# Patient Record
Sex: MALE | Race: WHITE | NOT HISPANIC OR LATINO | Employment: OTHER | URBAN - METROPOLITAN AREA
[De-identification: names, ages, dates, MRNs, and addresses within clinical notes are randomized per-mention and may not be internally consistent; named-entity substitution may affect disease eponyms.]

---

## 2024-05-18 ENCOUNTER — HOSPITAL ENCOUNTER (EMERGENCY)
Facility: HOSPITAL | Age: 64
Discharge: HOME | End: 2024-05-19
Attending: EMERGENCY MEDICINE
Payer: COMMERCIAL

## 2024-05-18 DIAGNOSIS — R10.13 EPIGASTRIC ABDOMINAL PAIN: ICD-10-CM

## 2024-05-18 DIAGNOSIS — R14.1 ABDOMINAL GAS PAIN: Primary | ICD-10-CM

## 2024-05-18 DIAGNOSIS — K80.20 GALLSTONES: ICD-10-CM

## 2024-05-18 LAB
ALBUMIN SERPL BCP-MCNC: 4.2 G/DL (ref 3.5–5)
ALP SERPL-CCNC: 88 U/L (ref 34–104)
ALT SERPL W P-5'-P-CCNC: 33 U/L (ref 7–52)
ANION GAP SERPL CALCULATED.3IONS-SCNC: 6 MMOL/L (ref 4–13)
APTT PPP: 29 SECONDS (ref 23–37)
AST SERPL W P-5'-P-CCNC: 50 U/L (ref 13–39)
BASOPHILS # BLD MANUAL: 0.07 THOUSAND/UL (ref 0–0.1)
BASOPHILS NFR MAR MANUAL: 1 % (ref 0–1)
BILIRUB SERPL-MCNC: 0.67 MG/DL (ref 0.2–1)
BNP SERPL-MCNC: 28 PG/ML (ref 0–100)
BUN SERPL-MCNC: 23 MG/DL (ref 5–25)
CALCIUM SERPL-MCNC: 9.4 MG/DL (ref 8.4–10.2)
CARDIAC TROPONIN I PNL SERPL HS: 6 NG/L
CHLORIDE SERPL-SCNC: 106 MMOL/L (ref 96–108)
CO2 SERPL-SCNC: 30 MMOL/L (ref 21–32)
CREAT SERPL-MCNC: 1.11 MG/DL (ref 0.6–1.3)
EOSINOPHIL # BLD MANUAL: 0.13 THOUSAND/UL (ref 0–0.4)
EOSINOPHIL NFR BLD MANUAL: 2 % (ref 0–6)
ERYTHROCYTE [DISTWIDTH] IN BLOOD BY AUTOMATED COUNT: 13.3 % (ref 11.6–15.1)
GFR SERPL CREATININE-BSD FRML MDRD: 71 ML/MIN/1.73SQ M
GLUCOSE SERPL-MCNC: 95 MG/DL (ref 65–140)
HCT VFR BLD AUTO: 45.4 % (ref 36.5–49.3)
HGB BLD-MCNC: 14.7 G/DL (ref 12–17)
INR PPP: 0.98 (ref 0.84–1.19)
LACTATE SERPL-SCNC: 0.8 MMOL/L (ref 0.5–2)
LIPASE SERPL-CCNC: 89 U/L (ref 11–82)
LYMPHOCYTES # BLD AUTO: 2.59 THOUSAND/UL (ref 0.6–4.47)
LYMPHOCYTES # BLD AUTO: 34 % (ref 14–44)
MCH RBC QN AUTO: 29.5 PG (ref 26.8–34.3)
MCHC RBC AUTO-ENTMCNC: 32.4 G/DL (ref 31.4–37.4)
MCV RBC AUTO: 91 FL (ref 82–98)
MONOCYTES # BLD AUTO: 0.4 THOUSAND/UL (ref 0–1.22)
MONOCYTES NFR BLD: 6 % (ref 4–12)
NEUTROPHILS # BLD MANUAL: 3.45 THOUSAND/UL (ref 1.85–7.62)
NEUTS BAND NFR BLD MANUAL: 1 % (ref 0–8)
NEUTS SEG NFR BLD AUTO: 51 % (ref 43–75)
PLATELET # BLD AUTO: 171 THOUSANDS/UL (ref 149–390)
PLATELET BLD QL SMEAR: ADEQUATE
PMV BLD AUTO: 11.2 FL (ref 8.9–12.7)
POTASSIUM SERPL-SCNC: 4 MMOL/L (ref 3.5–5.3)
PROT SERPL-MCNC: 7.3 G/DL (ref 6.4–8.4)
PROTHROMBIN TIME: 13.4 SECONDS (ref 11.6–14.5)
RBC # BLD AUTO: 4.98 MILLION/UL (ref 3.88–5.62)
RBC MORPH BLD: NORMAL
SODIUM SERPL-SCNC: 142 MMOL/L (ref 135–147)
VARIANT LYMPHS # BLD AUTO: 5 %
WBC # BLD AUTO: 6.64 THOUSAND/UL (ref 4.31–10.16)

## 2024-05-18 PROCEDURE — 85027 COMPLETE CBC AUTOMATED: CPT | Performed by: EMERGENCY MEDICINE

## 2024-05-18 PROCEDURE — 99284 EMERGENCY DEPT VISIT MOD MDM: CPT

## 2024-05-18 PROCEDURE — 80053 COMPREHEN METABOLIC PANEL: CPT | Performed by: EMERGENCY MEDICINE

## 2024-05-18 PROCEDURE — 96375 TX/PRO/DX INJ NEW DRUG ADDON: CPT

## 2024-05-18 PROCEDURE — C9113 INJ PANTOPRAZOLE SODIUM, VIA: HCPCS | Performed by: EMERGENCY MEDICINE

## 2024-05-18 PROCEDURE — 83880 ASSAY OF NATRIURETIC PEPTIDE: CPT | Performed by: EMERGENCY MEDICINE

## 2024-05-18 PROCEDURE — 84484 ASSAY OF TROPONIN QUANT: CPT | Performed by: EMERGENCY MEDICINE

## 2024-05-18 PROCEDURE — 96374 THER/PROPH/DIAG INJ IV PUSH: CPT

## 2024-05-18 PROCEDURE — 85610 PROTHROMBIN TIME: CPT | Performed by: EMERGENCY MEDICINE

## 2024-05-18 PROCEDURE — 99285 EMERGENCY DEPT VISIT HI MDM: CPT | Performed by: EMERGENCY MEDICINE

## 2024-05-18 PROCEDURE — 36415 COLL VENOUS BLD VENIPUNCTURE: CPT | Performed by: EMERGENCY MEDICINE

## 2024-05-18 PROCEDURE — 85007 BL SMEAR W/DIFF WBC COUNT: CPT | Performed by: EMERGENCY MEDICINE

## 2024-05-18 PROCEDURE — 85730 THROMBOPLASTIN TIME PARTIAL: CPT | Performed by: EMERGENCY MEDICINE

## 2024-05-18 PROCEDURE — 83605 ASSAY OF LACTIC ACID: CPT | Performed by: EMERGENCY MEDICINE

## 2024-05-18 PROCEDURE — 83690 ASSAY OF LIPASE: CPT | Performed by: EMERGENCY MEDICINE

## 2024-05-18 PROCEDURE — 93005 ELECTROCARDIOGRAM TRACING: CPT

## 2024-05-18 RX ORDER — PANTOPRAZOLE SODIUM 40 MG/10ML
40 INJECTION, POWDER, LYOPHILIZED, FOR SOLUTION INTRAVENOUS ONCE
Status: COMPLETED | OUTPATIENT
Start: 2024-05-18 | End: 2024-05-18

## 2024-05-18 RX ORDER — HYDROMORPHONE HCL/PF 1 MG/ML
0.5 SYRINGE (ML) INJECTION ONCE
Status: COMPLETED | OUTPATIENT
Start: 2024-05-18 | End: 2024-05-18

## 2024-05-18 RX ORDER — SIMETHICONE 80 MG
160 TABLET,CHEWABLE ORAL ONCE
Status: COMPLETED | OUTPATIENT
Start: 2024-05-18 | End: 2024-05-18

## 2024-05-18 RX ADMIN — PANTOPRAZOLE SODIUM 40 MG: 40 INJECTION, POWDER, FOR SOLUTION INTRAVENOUS at 22:36

## 2024-05-18 RX ADMIN — HYDROMORPHONE HYDROCHLORIDE 0.5 MG: 1 INJECTION, SOLUTION INTRAMUSCULAR; INTRAVENOUS; SUBCUTANEOUS at 22:37

## 2024-05-18 RX ADMIN — SIMETHICONE 160 MG: 80 TABLET, CHEWABLE ORAL at 22:37

## 2024-05-19 ENCOUNTER — APPOINTMENT (EMERGENCY)
Dept: CT IMAGING | Facility: HOSPITAL | Age: 64
End: 2024-05-19
Payer: COMMERCIAL

## 2024-05-19 VITALS
SYSTOLIC BLOOD PRESSURE: 164 MMHG | WEIGHT: 252 LBS | HEIGHT: 65 IN | DIASTOLIC BLOOD PRESSURE: 65 MMHG | RESPIRATION RATE: 17 BRPM | HEART RATE: 71 BPM | OXYGEN SATURATION: 96 % | TEMPERATURE: 98.7 F | BODY MASS INDEX: 41.99 KG/M2

## 2024-05-19 LAB
2HR DELTA HS TROPONIN: 1 NG/L
ATRIAL RATE: 66 BPM
CARDIAC TROPONIN I PNL SERPL HS: 7 NG/L
P AXIS: 66 DEGREES
PR INTERVAL: 162 MS
QRS AXIS: 45 DEGREES
QRSD INTERVAL: 92 MS
QT INTERVAL: 380 MS
QTC INTERVAL: 398 MS
T WAVE AXIS: 50 DEGREES
VENTRICULAR RATE: 66 BPM

## 2024-05-19 PROCEDURE — 84484 ASSAY OF TROPONIN QUANT: CPT | Performed by: EMERGENCY MEDICINE

## 2024-05-19 PROCEDURE — 74176 CT ABD & PELVIS W/O CONTRAST: CPT

## 2024-05-19 PROCEDURE — 36415 COLL VENOUS BLD VENIPUNCTURE: CPT | Performed by: EMERGENCY MEDICINE

## 2024-05-19 PROCEDURE — 71250 CT THORAX DX C-: CPT

## 2024-05-19 PROCEDURE — 93010 ELECTROCARDIOGRAM REPORT: CPT | Performed by: INTERNAL MEDICINE

## 2024-05-19 RX ADMIN — IOHEXOL 50 ML: 240 INJECTION, SOLUTION INTRATHECAL; INTRAVASCULAR; INTRAVENOUS; ORAL at 01:02

## 2024-05-19 NOTE — ED PROVIDER NOTES
"History  Chief Complaint   Patient presents with    Abdominal Pain     EMS states that pt was working tonight and had a cheese steak hoagie. EMS stated that pt drank milk and it didn't help with the reflux. Pt is a gastric bypass pt in 2020     62 yo male who is s/p gastric sleeve a few years ago and tonight at work ate cheese steak and shortly after felt like his \"stomach was going to pop\".  Denies actual CP or SOB. Feels bloated.      History provided by:  Patient and EMS personnel   used: No    Abdominal Pain  Pain location:  Epigastric  Pain quality: bloating, gnawing, heavy and pressure    Pain severity:  Moderate  Onset quality:  Sudden  Duration: 30 min.  Timing:  Constant  Progression:  Waxing and waning  Chronicity:  New  Context: eating    Relieved by:  Nothing  Ineffective treatments:  None tried  Associated symptoms: no chest pain, no chills, no cough, no dysuria, no fever, no hematuria, no nausea, no shortness of breath, no sore throat and no vomiting    Multiple surgeries: gastric sleeve.        None       Past Medical History:   Diagnosis Date    Anemia     Asthma     CHF (congestive heart failure) (HCC)     Chronic fatigue syndrome     Coronary artery disease     Hyperlipidemia     Hypertension     Hypothyroidism     Obesity     Respiratory disease        Past Surgical History:   Procedure Laterality Date    CARDIAC CATHETERIZATION      CORONARY ANGIOPLASTY WITH STENT PLACEMENT      4 stents    GASTRIC BYPASS      2020    HERNIA REPAIR      KNEE SURGERY Right     TONSILLECTOMY         History reviewed. No pertinent family history.  I have reviewed and agree with the history as documented.    E-Cigarette/Vaping    E-Cigarette Use Never User      E-Cigarette/Vaping Substances     Social History     Tobacco Use    Smoking status: Never    Smokeless tobacco: Never   Vaping Use    Vaping status: Never Used   Substance Use Topics    Alcohol use: Never    Drug use: Never       Review of " Systems   Constitutional:  Negative for chills and fever.   HENT:  Negative for ear pain and sore throat.    Eyes:  Negative for pain and visual disturbance.   Respiratory:  Negative for cough and shortness of breath.    Cardiovascular:  Negative for chest pain and palpitations.   Gastrointestinal:  Positive for abdominal pain (epigastric - diffuse upper). Negative for nausea and vomiting.   Genitourinary:  Negative for dysuria and hematuria.   Musculoskeletal:  Negative for arthralgias and back pain.   Skin:  Negative for color change and rash.   Neurological:  Negative for seizures and syncope.   All other systems reviewed and are negative.      Physical Exam  Physical Exam  Vitals and nursing note reviewed.   Constitutional:       General: He is not in acute distress.     Appearance: He is well-developed.   HENT:      Head: Normocephalic and atraumatic.   Eyes:      Conjunctiva/sclera: Conjunctivae normal.   Cardiovascular:      Rate and Rhythm: Normal rate and regular rhythm.      Heart sounds: No murmur heard.  Pulmonary:      Effort: Pulmonary effort is normal. No respiratory distress.      Breath sounds: Normal breath sounds.   Abdominal:      General: Bowel sounds are normal.      Palpations: Abdomen is soft.      Tenderness: There is abdominal tenderness in the epigastric area.   Musculoskeletal:         General: No swelling.      Cervical back: Neck supple.   Skin:     General: Skin is warm and dry.      Capillary Refill: Capillary refill takes less than 2 seconds.   Neurological:      Mental Status: He is alert and oriented to person, place, and time.   Psychiatric:         Mood and Affect: Mood normal.         Vital Signs  ED Triage Vitals   Temperature Pulse Respirations Blood Pressure SpO2   05/18/24 2218 05/18/24 2218 05/18/24 2218 05/18/24 2218 05/18/24 2218   98.7 °F (37.1 °C) 65 20 170/80 98 %      Temp Source Heart Rate Source Patient Position - Orthostatic VS BP Location FiO2 (%)   05/18/24 2218  05/18/24 2300 05/18/24 2218 05/18/24 2218 --   Temporal Monitor Lying Left arm       Pain Score       05/18/24 2237       10 - Worst Possible Pain           Vitals:    05/18/24 2218 05/18/24 2300 05/19/24 0100   BP: 170/80 157/79 164/65   Pulse: 65 67 71   Patient Position - Orthostatic VS: Lying           Visual Acuity      ED Medications  Medications   pantoprazole (PROTONIX) injection 40 mg (40 mg Intravenous Given 5/18/24 2236)   HYDROmorphone (DILAUDID) injection 0.5 mg (0.5 mg Intravenous Given 5/18/24 2237)   simethicone (MYLICON) chewable tablet 160 mg (160 mg Oral Given 5/18/24 2237)   iohexol (OMNIPAQUE) 240 MG/ML solution 50 mL (50 mL Oral Given 5/19/24 0102)       Diagnostic Studies  Results Reviewed       Procedure Component Value Units Date/Time    HS Troponin I 2hr [[id number]]  (Normal) Collected: 05/19/24 0014    Lab Status: Final result Specimen: Blood from Arm, Left Updated: 05/19/24 0041     hs TnI 2hr 7 ng/L      Delta 2hr hsTnI 1 ng/L     B-Type Natriuretic Peptide(BNP) [[id number]]  (Normal) Collected: 05/18/24 2221    Lab Status: Final result Specimen: Blood from Arm, Left Updated: 05/18/24 2323     BNP 28 pg/mL     RBC Morphology Reflex Test [[id number]] Collected: 05/18/24 2221    Lab Status: Final result Specimen: Blood from Arm, Left Updated: 05/18/24 2301    CBC and differential [[id number]]  (Normal) Collected: 05/18/24 2221    Lab Status: Final result Specimen: Blood from Arm, Left Updated: 05/18/24 2300     WBC 6.64 Thousand/uL      RBC 4.98 Million/uL      Hemoglobin 14.7 g/dL      Hematocrit 45.4 %      MCV 91 fL      MCH 29.5 pg      MCHC 32.4 g/dL      RDW 13.3 %      MPV 11.2 fL      Platelets 171 Thousands/uL     Narrative:      This is an appended report.  These results have been appended to a previously verified report.    Manual Differential(PHLEBS Do Not Order) [[id number]]  (Abnormal) Collected: 05/18/24 2225    Lab Status: Final result Specimen: Blood from Arm, Left Updated:  05/18/24 2300     Segmented % 51 %      Bands % 1 %      Lymphocytes % 34 %      Monocytes % 6 %      Eosinophils % 2 %      Basophils % 1 %      Atypical Lymphocytes % 5 %      Absolute Neutrophils 3.45 Thousand/uL      Absolute Lymphocytes 2.59 Thousand/uL      Absolute Monocytes 0.40 Thousand/uL      Absolute Eosinophils 0.13 Thousand/uL      Absolute Basophils 0.07 Thousand/uL      Total Counted --     RBC Morphology Normal     Platelet Estimate Adequate    HS Troponin 0hr (reflex protocol) [[id number]]  (Normal) Collected: 05/18/24 2221    Lab Status: Final result Specimen: Blood from Arm, Left Updated: 05/18/24 2251     hs TnI 0hr 6 ng/L     Comprehensive metabolic panel [[id number]]  (Abnormal) Collected: 05/18/24 2221    Lab Status: Final result Specimen: Blood from Arm, Left Updated: 05/18/24 2244     Sodium 142 mmol/L      Potassium 4.0 mmol/L      Chloride 106 mmol/L      CO2 30 mmol/L      ANION GAP 6 mmol/L      BUN 23 mg/dL      Creatinine 1.11 mg/dL      Glucose 95 mg/dL      Calcium 9.4 mg/dL      AST 50 U/L      ALT 33 U/L      Alkaline Phosphatase 88 U/L      Total Protein 7.3 g/dL      Albumin 4.2 g/dL      Total Bilirubin 0.67 mg/dL      eGFR 71 ml/min/1.73sq m     Narrative:      National Kidney Disease Foundation guidelines for Chronic Kidney Disease (CKD):     Stage 1 with normal or high GFR (GFR > 90 mL/min/1.73 square meters)    Stage 2 Mild CKD (GFR = 60-89 mL/min/1.73 square meters)    Stage 3A Moderate CKD (GFR = 45-59 mL/min/1.73 square meters)    Stage 3B Moderate CKD (GFR = 30-44 mL/min/1.73 square meters)    Stage 4 Severe CKD (GFR = 15-29 mL/min/1.73 square meters)    Stage 5 End Stage CKD (GFR <15 mL/min/1.73 square meters)  Note: GFR calculation is accurate only with a steady state creatinine    Lactic acid, plasma (w/reflex if result > 2.0) [[id number]]  (Normal) Collected: 05/18/24 2221    Lab Status: Final result Specimen: Blood from Arm, Left Updated: 05/18/24 2244     LACTIC ACID  0.8 mmol/L     Narrative:      Result may be elevated if tourniquet was used during collection.    Lipase [[id number]]  (Abnormal) Collected: 05/18/24 2221    Lab Status: Final result Specimen: Blood from Arm, Left Updated: 05/18/24 2244     Lipase 89 u/L     Protime-INR [[id number]]  (Normal) Collected: 05/18/24 2221    Lab Status: Final result Specimen: Blood from Arm, Left Updated: 05/18/24 2242     Protime 13.4 seconds      INR 0.98    APTT [[id number]]  (Normal) Collected: 05/18/24 2221    Lab Status: Final result Specimen: Blood from Arm, Left Updated: 05/18/24 2242     PTT 29 seconds                    CT chest abdomen pelvis wo contrast   Final Result by Yaw Sosa MD (05/19 0154)      1.  Status post sleeve gastrectomy. No bowel obstruction.   2.  Cholelithiasis without evidence of cholecystitis.      Workstation performed: [id number]                    Procedures  ECG 12 Lead Documentation Only    Date/Time: 5/18/2024 11:13 PM    Performed by: Claire Langford DO  Authorized by: Claire Langford DO    Patient location:  ED  Interpretation:     Interpretation: normal    Rate:     ECG rate:  66    ECG rate assessment: normal    Rhythm:     Rhythm: sinus rhythm    Ectopy:     Ectopy: none    QRS:     QRS axis:  Normal    QRS intervals:  Normal  Conduction:     Conduction: normal    ST segments:     ST segments:  Normal  T waves:     T waves: normal             ED Course  ED Course as of 05/19/24 0424   Sat May 18, 2024   2216 Pt seen and examined.    2244 CBC, CMP, lipase and lactate WNL.  Will CT r/o dissection.  Will do po contrast due to gastric sleeve hx - unable to give IV dye due to anaphylaxis.   2312 Pt belching and feeling much better.   2319 Pt given oral contrast from CT tech.   Sun May 19, 2024   0047 Pt being taken for CT scan.   0204 CT shows 1.  Status post sleeve gastrectomy. No bowel obstruction.  2.  Cholelithiasis without evidence of cholecystitis.                                                Medical Decision Making  Amount and/or Complexity of Data Reviewed  Labs: ordered.  Radiology: ordered.    Risk  OTC drugs.  Prescription drug management.             Disposition  Final diagnoses:   Abdominal gas pain   Epigastric abdominal pain   Gallstones     Time reflects when diagnosis was documented in both MDM as applicable and the Disposition within this note       Time User Action Codes Description Comment    5/19/2024  2:08 AM Claire Langford Add [R14.1] Abdominal gas pain     5/19/2024  2:08 AM Claire Langford Add [R10.13] Epigastric abdominal pain     5/19/2024  2:08 AM Claire Langford Add [K80.20] Gallstones           ED Disposition       ED Disposition   Discharge    Condition   Stable    Date/Time   Sun May 19, 2024  2:08 AM    Comment   Alexandru Smith discharge to home/self care.                   Follow-up Information       Follow up With Specialties Details Why Contact [facility] Schedule an appointment as soon as possible for a visit  to discuss CT results and ER visit [address] [facility]  [place] [address]  [phone]              There are no discharge medications for this patient.      No discharge procedures on file.    PDMP Review       None            ED Provider  Electronically Signed by             Claire Langford DO  05/19/24 0424

## 2024-10-26 ENCOUNTER — HOSPITAL ENCOUNTER (EMERGENCY)
Facility: HOSPITAL | Age: 64
Discharge: HOME | End: 2024-10-26
Attending: EMERGENCY MEDICINE | Admitting: EMERGENCY MEDICINE
Payer: COMMERCIAL

## 2024-10-26 ENCOUNTER — APPOINTMENT (EMERGENCY)
Dept: CT IMAGING | Facility: HOSPITAL | Age: 64
End: 2024-10-26
Payer: COMMERCIAL

## 2024-10-26 ENCOUNTER — APPOINTMENT (EMERGENCY)
Dept: RADIOLOGY | Facility: HOSPITAL | Age: 64
End: 2024-10-26
Payer: COMMERCIAL

## 2024-10-26 VITALS
DIASTOLIC BLOOD PRESSURE: 67 MMHG | OXYGEN SATURATION: 94 % | SYSTOLIC BLOOD PRESSURE: 156 MMHG | HEART RATE: 62 BPM | TEMPERATURE: 97.8 F | RESPIRATION RATE: 15 BRPM

## 2024-10-26 DIAGNOSIS — M43.00 PARS DEFECT: ICD-10-CM

## 2024-10-26 DIAGNOSIS — K80.20 CHOLELITHIASIS: Primary | ICD-10-CM

## 2024-10-26 DIAGNOSIS — N28.1 RENAL CYST: ICD-10-CM

## 2024-10-26 DIAGNOSIS — R10.9 ABDOMINAL PAIN: ICD-10-CM

## 2024-10-26 DIAGNOSIS — M43.10 ANTEROLISTHESIS: ICD-10-CM

## 2024-10-26 DIAGNOSIS — R74.01 TRANSAMINITIS: ICD-10-CM

## 2024-10-26 LAB
2HR DELTA HS TROPONIN: 0 NG/L
ALBUMIN SERPL BCG-MCNC: 4.5 G/DL (ref 3.5–5)
ALP SERPL-CCNC: 71 U/L (ref 34–104)
ALT SERPL W P-5'-P-CCNC: 59 U/L (ref 7–52)
ANION GAP SERPL CALCULATED.3IONS-SCNC: 8 MMOL/L (ref 4–13)
AST SERPL W P-5'-P-CCNC: 83 U/L (ref 13–39)
BASOPHILS # BLD AUTO: 0.05 THOUSANDS/ΜL (ref 0–0.1)
BASOPHILS NFR BLD AUTO: 1 % (ref 0–1)
BILIRUB SERPL-MCNC: 0.8 MG/DL (ref 0.2–1)
BUN SERPL-MCNC: 22 MG/DL (ref 5–25)
CALCIUM SERPL-MCNC: 9.3 MG/DL (ref 8.4–10.2)
CARDIAC TROPONIN I PNL SERPL HS: 5 NG/L
CARDIAC TROPONIN I PNL SERPL HS: 5 NG/L
CHLORIDE SERPL-SCNC: 101 MMOL/L (ref 96–108)
CO2 SERPL-SCNC: 32 MMOL/L (ref 21–32)
CREAT SERPL-MCNC: 1.18 MG/DL (ref 0.6–1.3)
EOSINOPHIL # BLD AUTO: 0.11 THOUSAND/ΜL (ref 0–0.61)
EOSINOPHIL NFR BLD AUTO: 1 % (ref 0–6)
ERYTHROCYTE [DISTWIDTH] IN BLOOD BY AUTOMATED COUNT: 13 % (ref 11.6–15.1)
GFR SERPL CREATININE-BSD FRML MDRD: 65 ML/MIN/1.73SQ M
GLUCOSE SERPL-MCNC: 103 MG/DL (ref 65–140)
HCT VFR BLD AUTO: 47.9 % (ref 36.5–49.3)
HGB BLD-MCNC: 15.9 G/DL (ref 12–17)
IMM GRANULOCYTES # BLD AUTO: 0.04 THOUSAND/UL (ref 0–0.2)
IMM GRANULOCYTES NFR BLD AUTO: 0 % (ref 0–2)
LIPASE SERPL-CCNC: 106 U/L (ref 11–82)
LYMPHOCYTES # BLD AUTO: 3.38 THOUSANDS/ΜL (ref 0.6–4.47)
LYMPHOCYTES NFR BLD AUTO: 38 % (ref 14–44)
MCH RBC QN AUTO: 30.2 PG (ref 26.8–34.3)
MCHC RBC AUTO-ENTMCNC: 33.2 G/DL (ref 31.4–37.4)
MCV RBC AUTO: 91 FL (ref 82–98)
MONOCYTES # BLD AUTO: 0.78 THOUSAND/ΜL (ref 0.17–1.22)
MONOCYTES NFR BLD AUTO: 9 % (ref 4–12)
NEUTROPHILS # BLD AUTO: 4.58 THOUSANDS/ΜL (ref 1.85–7.62)
NEUTS SEG NFR BLD AUTO: 51 % (ref 43–75)
NRBC BLD AUTO-RTO: 0 /100 WBCS
PLATELET # BLD AUTO: 179 THOUSANDS/UL (ref 149–390)
PMV BLD AUTO: 11.3 FL (ref 8.9–12.7)
POTASSIUM SERPL-SCNC: 3.8 MMOL/L (ref 3.5–5.3)
PROT SERPL-MCNC: 7.5 G/DL (ref 6.4–8.4)
RBC # BLD AUTO: 5.27 MILLION/UL (ref 3.88–5.62)
SODIUM SERPL-SCNC: 141 MMOL/L (ref 135–147)
WBC # BLD AUTO: 8.94 THOUSAND/UL (ref 4.31–10.16)

## 2024-10-26 PROCEDURE — 99284 EMERGENCY DEPT VISIT MOD MDM: CPT

## 2024-10-26 PROCEDURE — 71045 X-RAY EXAM CHEST 1 VIEW: CPT

## 2024-10-26 PROCEDURE — 96375 TX/PRO/DX INJ NEW DRUG ADDON: CPT

## 2024-10-26 PROCEDURE — 74176 CT ABD & PELVIS W/O CONTRAST: CPT

## 2024-10-26 PROCEDURE — 84484 ASSAY OF TROPONIN QUANT: CPT | Performed by: EMERGENCY MEDICINE

## 2024-10-26 PROCEDURE — 99284 EMERGENCY DEPT VISIT MOD MDM: CPT | Performed by: EMERGENCY MEDICINE

## 2024-10-26 PROCEDURE — 93005 ELECTROCARDIOGRAM TRACING: CPT

## 2024-10-26 PROCEDURE — 36415 COLL VENOUS BLD VENIPUNCTURE: CPT | Performed by: EMERGENCY MEDICINE

## 2024-10-26 PROCEDURE — 96374 THER/PROPH/DIAG INJ IV PUSH: CPT

## 2024-10-26 PROCEDURE — 80053 COMPREHEN METABOLIC PANEL: CPT | Performed by: EMERGENCY MEDICINE

## 2024-10-26 PROCEDURE — 83690 ASSAY OF LIPASE: CPT | Performed by: EMERGENCY MEDICINE

## 2024-10-26 PROCEDURE — 85025 COMPLETE CBC W/AUTO DIFF WBC: CPT | Performed by: EMERGENCY MEDICINE

## 2024-10-26 RX ORDER — HYDROMORPHONE HCL/PF 1 MG/ML
1 SYRINGE (ML) INJECTION ONCE
Status: DISCONTINUED | OUTPATIENT
Start: 2024-10-26 | End: 2024-10-26

## 2024-10-26 RX ORDER — FAMOTIDINE 10 MG/ML
20 INJECTION, SOLUTION INTRAVENOUS ONCE
Status: COMPLETED | OUTPATIENT
Start: 2024-10-26 | End: 2024-10-26

## 2024-10-26 RX ORDER — HYDROMORPHONE HCL/PF 1 MG/ML
1 SYRINGE (ML) INJECTION ONCE
Status: COMPLETED | OUTPATIENT
Start: 2024-10-26 | End: 2024-10-26

## 2024-10-26 RX ORDER — MAGNESIUM HYDROXIDE/ALUMINUM HYDROXICE/SIMETHICONE 120; 1200; 1200 MG/30ML; MG/30ML; MG/30ML
30 SUSPENSION ORAL ONCE
Status: COMPLETED | OUTPATIENT
Start: 2024-10-26 | End: 2024-10-26

## 2024-10-26 RX ADMIN — IOHEXOL 50 ML: 240 INJECTION, SOLUTION INTRATHECAL; INTRAVASCULAR; INTRAVENOUS; ORAL at 20:59

## 2024-10-26 RX ADMIN — FAMOTIDINE 20 MG: 10 INJECTION, SOLUTION INTRAVENOUS at 19:01

## 2024-10-26 RX ADMIN — ALUMINUM HYDROXIDE, MAGNESIUM HYDROXIDE, AND SIMETHICONE 30 ML: 200; 200; 20 SUSPENSION ORAL at 19:01

## 2024-10-26 RX ADMIN — HYDROMORPHONE HYDROCHLORIDE 1 MG: 1 INJECTION, SOLUTION INTRAMUSCULAR; INTRAVENOUS; SUBCUTANEOUS at 19:47

## 2024-10-27 LAB
ATRIAL RATE: 68 BPM
P AXIS: 66 DEGREES
PR INTERVAL: 176 MS
QRS AXIS: 43 DEGREES
QRSD INTERVAL: 90 MS
QT INTERVAL: 382 MS
QTC INTERVAL: 406 MS
T WAVE AXIS: 48 DEGREES
VENTRICULAR RATE: 68 BPM

## 2024-10-27 PROCEDURE — 93010 ELECTROCARDIOGRAM REPORT: CPT | Performed by: INTERNAL MEDICINE

## 2024-10-27 NOTE — ED PROVIDER NOTES
Time reflects when diagnosis was documented in both MDM as applicable and the Disposition within this note       Time User Action Codes Description Comment    10/26/2024  9:59 PM Kaminski, Johnathon Add [K80.20] Cholelithiasis     10/26/2024 10:15 PM Kaminski, Johnathon Add [R10.9] Abdominal pain     10/26/2024 10:15 PM Kaminski, Johnathon Add [R74.01] Transaminitis     10/26/2024 10:36 PM Kaminski, Johnathon Add [N28.1] Renal cyst     10/26/2024 10:37 PM Kaminski, Johnathon Add [M43.10] Anterolisthesis     10/26/2024 10:37 PM Kaminski, Johnathon Add [M43.00] Pars defect           ED Disposition       ED Disposition   Discharge    Condition   Stable    Date/Time   Sat Oct 26, 2024 10:15 PM    Comment   Alexandru Ram Luis discharge to home/self care.                   Assessment & Plan       Medical Decision Making  64-year-old male with epigastric abdominal pain.  Consider gastritis, cholecystitis, ACS although low suspicion.  Obtain labs, EKG.  Symptom control, CT abdomen pelvis.    Given mild transaminitis, discussed case with gen surg who advised US. Discussed with US tech who indicated without a leukocytosis and normal CT, this can be performed as an outpatient.     Upon reassessment, patient with complete resolution of symptoms. Discussed all results with patient and advised close FU with gen surg. Amb referral ordered and strict return precautions discussed.     Amount and/or Complexity of Data Reviewed  Labs: ordered.  Radiology: ordered.    Risk  OTC drugs.  Prescription drug management.             Medications   aluminum-magnesium hydroxide-simethicone (MAALOX) oral suspension 30 mL (30 mL Oral Given 10/26/24 1901)   Famotidine (PF) (PEPCID) injection 20 mg (20 mg Intravenous Given 10/26/24 1901)   HYDROmorphone (DILAUDID) injection 1 mg (1 mg Intravenous Given 10/26/24 1947)   iohexol (OMNIPAQUE) 240 MG/ML solution 50 mL (50 mL Oral Given 10/26/24 2059)       ED Risk Strat Scores                           SBIRT 20yo+      Flowsheet Row  Most Recent Value   Initial Alcohol Screen: US AUDIT-C     1. How often do you have a drink containing alcohol? 0 Filed at: 10/26/2024 1837   2. How many drinks containing alcohol do you have on a typical day you are drinking?  0 Filed at: 10/26/2024 1837   3a. Male UNDER 65: How often do you have five or more drinks on one occasion? 0 Filed at: 10/26/2024 1837   3b. FEMALE Any Age, or MALE 65+: How often do you have 4 or more drinks on one occassion? 0 Filed at: 10/26/2024 1837   Audit-C Score 0 Filed at: 10/26/2024 1837   BARBRA: How many times in the past year have you...    Used an illegal drug or used a prescription medication for non-medical reasons? Never Filed at: 10/26/2024 1837                            History of Present Illness       Chief Complaint   Patient presents with    Epigastric Pain     Pt reports epigastric pain. Pt reports pain started 30 minutes PTA. Pt reports pain decrease with laying.        Past Medical History:   Diagnosis Date    Anemia     Asthma     CHF (congestive heart failure) (HCC)     Chronic fatigue syndrome     Coronary artery disease     Hyperlipidemia     Hypertension     Hypothyroidism     Obesity     Respiratory disease       Past Surgical History:   Procedure Laterality Date    CARDIAC CATHETERIZATION      CORONARY ANGIOPLASTY WITH STENT PLACEMENT      4 stents    GASTRIC BYPASS      2020    HERNIA REPAIR      KNEE SURGERY Right     TONSILLECTOMY        History reviewed. No pertinent family history.   Social History     Tobacco Use    Smoking status: Never    Smokeless tobacco: Never   Vaping Use    Vaping status: Never Used   Substance Use Topics    Alcohol use: Never    Drug use: Never      E-Cigarette/Vaping    E-Cigarette Use Never User       E-Cigarette/Vaping Substances      I have reviewed and agree with the history as documented.     64-year-old male with sudden onset epigastric abdominal pain that started 1 hour prior to arrival.  Patient was eating bread sticks  shortly prior to symptom onset.  Reports similar pain a few months ago and was thought to be biliary colic at that time.  Denies chest pain, shortness of breath, vomiting.        Review of Systems   Gastrointestinal:  Positive for abdominal pain.           Objective       ED Triage Vitals   Temperature Pulse Blood Pressure Respirations SpO2 Patient Position - Orthostatic VS   10/26/24 1838 10/26/24 1838 10/26/24 1839 10/26/24 1838 10/26/24 1838 10/26/24 1838   97.8 °F (36.6 °C) 69 (!) 216/90 18 100 % Sitting      Temp Source Heart Rate Source BP Location FiO2 (%) Pain Score    10/26/24 1838 10/26/24 1838 10/26/24 1838 -- 10/26/24 1838    Temporal Monitor Right arm  8      Vitals      Date and Time Temp Pulse SpO2 Resp BP Pain Score FACES Pain Rating User   10/26/24 2200 -- 62 94 % 15 156/67 -- --    10/26/24 2116 -- -- -- -- -- No Pain -- BA   10/26/24 2030 -- 64 95 % 16 141/64 -- -- MH   10/26/24 2000 -- 64 96 % 14 153/69 -- --    10/26/24 1845 -- 67 100 % 18 216/90 10 - Worst Possible Pain -- EW   10/26/24 1841 -- -- -- -- -- No Pain -- EW   10/26/24 1839 -- -- -- -- 216/90 -- -- CM   10/26/24 1838 97.8 °F (36.6 °C) 69 100 % 18 -- 8 -- CM            Physical Exam  Vitals and nursing note reviewed.   Constitutional:       General: He is not in acute distress.     Appearance: He is well-developed.   HENT:      Head: Normocephalic and atraumatic.      Right Ear: External ear normal.      Left Ear: External ear normal.      Nose: Nose normal.   Eyes:      General: No scleral icterus.  Pulmonary:      Effort: Pulmonary effort is normal. No respiratory distress.   Abdominal:      General: There is no distension.      Palpations: Abdomen is soft.      Tenderness: There is abdominal tenderness.      Comments: Tender in epigastric/right upper quadrant   Musculoskeletal:         General: No deformity. Normal range of motion.      Cervical back: Normal range of motion and neck supple.   Skin:     General: Skin is warm.       Findings: No rash.   Neurological:      General: No focal deficit present.      Mental Status: He is alert.      Gait: Gait normal.   Psychiatric:         Mood and Affect: Mood normal.         Results Reviewed       Procedure Component Value Units Date/Time    HS Troponin I 2hr [[id number]]  (Normal) Collected: 10/26/24 2044    Lab Status: Final result Specimen: Blood from Arm, Right Updated: 10/26/24 2113     hs TnI 2hr 5 ng/L      Delta 2hr hsTnI 0 ng/L     HS Troponin I 4hr [[id number]]     Lab Status: No result Specimen: Blood     HS Troponin 0hr (reflex protocol) [[id number]]  (Normal) Collected: 10/26/24 1841    Lab Status: Final result Specimen: Blood from Arm, Right Updated: 10/26/24 1916     hs TnI 0hr 5 ng/L     CMP [[id number]]  (Abnormal) Collected: 10/26/24 1841    Lab Status: Final result Specimen: Blood from Arm, Right Updated: 10/26/24 1904     Sodium 141 mmol/L      Potassium 3.8 mmol/L      Chloride 101 mmol/L      CO2 32 mmol/L      ANION GAP 8 mmol/L      BUN 22 mg/dL      Creatinine 1.18 mg/dL      Glucose 103 mg/dL      Calcium 9.3 mg/dL      AST 83 U/L      ALT 59 U/L      Alkaline Phosphatase 71 U/L      Total Protein 7.5 g/dL      Albumin 4.5 g/dL      Total Bilirubin 0.80 mg/dL      eGFR 65 ml/min/1.73sq m     Narrative:      National Kidney Disease Foundation guidelines for Chronic Kidney Disease (CKD):     Stage 1 with normal or high GFR (GFR > 90 mL/min/1.73 square meters)    Stage 2 Mild CKD (GFR = 60-89 mL/min/1.73 square meters)    Stage 3A Moderate CKD (GFR = 45-59 mL/min/1.73 square meters)    Stage 3B Moderate CKD (GFR = 30-44 mL/min/1.73 square meters)    Stage 4 Severe CKD (GFR = 15-29 mL/min/1.73 square meters)    Stage 5 End Stage CKD (GFR <15 mL/min/1.73 square meters)  Note: GFR calculation is accurate only with a steady state creatinine    Lipase [[id number]]  (Abnormal) Collected: 10/26/24 1841    Lab Status: Final result Specimen: Blood from Arm, Right Updated: 10/26/24  1904     Lipase 106 u/L     CBC and differential [[id number]] Collected: 10/26/24 1841    Lab Status: Final result Specimen: Blood from Arm, Right Updated: 10/26/24 1849     WBC 8.94 Thousand/uL      RBC 5.27 Million/uL      Hemoglobin 15.9 g/dL      Hematocrit 47.9 %      MCV 91 fL      MCH 30.2 pg      MCHC 33.2 g/dL      RDW 13.0 %      MPV 11.3 fL      Platelets 179 Thousands/uL      nRBC 0 /100 WBCs      Segmented % 51 %      Immature Grans % 0 %      Lymphocytes % 38 %      Monocytes % 9 %      Eosinophils Relative 1 %      Basophils Relative 1 %      Absolute Neutrophils 4.58 Thousands/µL      Absolute Immature Grans 0.04 Thousand/uL      Absolute Lymphocytes 3.38 Thousands/µL      Absolute Monocytes 0.78 Thousand/µL      Eosinophils Absolute 0.11 Thousand/µL      Basophils Absolute 0.05 Thousands/µL             CT abdomen pelvis wo contrast   Final Interpretation by Feroz Philippe MD (10/26 2154)      Cholelithiasis without evidence for acute cholecystitis. No discrete peripancreatic inflammatory change identified.      Status post sleeve gastrectomy. No evidence for bowel obstruction.      Workstation performed: [id number]         X-ray chest 1 view portable    (Results Pending)       Procedures    ED Medication and Procedure Management   None     Patient's Medications    No medications on file       ED SEPSIS DOCUMENTATION   Time reflects when diagnosis was documented in both MDM as applicable and the Disposition within this note       Time User Action Codes Description Comment    10/26/2024  9:59 PM KaminskiJohnathon patel Add [K80.20] Cholelithiasis     10/26/2024 10:15 PM KaminskiJohnathon patel Add [R10.9] Abdominal pain     10/26/2024 10:15 PM KaminskiJohnathon patel Add [R74.01] Transaminitis     10/26/2024 10:36 PM Kaminski, Johnathon Add [N28.1] Renal cyst     10/26/2024 10:37 PM KaminskiJohnathon Add [M43.10] Anterolisthesis     10/26/2024 10:37 PM Kaminski, Johnathon Add [M43.00] Pars defect                  Johnathon Kaminski, DO  10/26/24 9792

## 2024-10-31 RX ORDER — CLOPIDOGREL BISULFATE 75 MG/1
75 TABLET ORAL DAILY
COMMUNITY

## 2024-10-31 RX ORDER — ACETAMINOPHEN 160 MG
2000 TABLET,DISINTEGRATING ORAL DAILY
COMMUNITY

## 2024-10-31 RX ORDER — METOPROLOL SUCCINATE 50 MG/1
TABLET, EXTENDED RELEASE ORAL
COMMUNITY
Start: 2024-08-10

## 2024-10-31 RX ORDER — BUMETANIDE 1 MG/1
1 TABLET ORAL DAILY
COMMUNITY

## 2024-10-31 RX ORDER — ASPIRIN 81 MG/1
81 TABLET, CHEWABLE ORAL DAILY
COMMUNITY

## 2024-10-31 RX ORDER — LEVOTHYROXINE SODIUM 50 UG/1
50 CAPSULE ORAL DAILY
COMMUNITY

## 2024-10-31 RX ORDER — CYCLOBENZAPRINE HCL 10 MG
10 TABLET ORAL
COMMUNITY

## 2024-10-31 RX ORDER — MONTELUKAST SODIUM 10 MG/1
10 TABLET ORAL
COMMUNITY
Start: 2024-08-10

## 2024-10-31 RX ORDER — ATORVASTATIN CALCIUM 80 MG/1
80 TABLET, FILM COATED ORAL DAILY
COMMUNITY

## 2024-10-31 RX ORDER — LEVOTHYROXINE SODIUM 50 UG/1
50 TABLET ORAL DAILY
COMMUNITY

## 2024-11-04 ENCOUNTER — CONSULT (OUTPATIENT)
Dept: SURGERY | Facility: HOSPITAL | Age: 64
End: 2024-11-04
Attending: EMERGENCY MEDICINE
Payer: COMMERCIAL

## 2024-11-04 VITALS
HEART RATE: 54 BPM | BODY MASS INDEX: 41.48 KG/M2 | WEIGHT: 249 LBS | HEIGHT: 65 IN | TEMPERATURE: 97.7 F | DIASTOLIC BLOOD PRESSURE: 81 MMHG | SYSTOLIC BLOOD PRESSURE: 131 MMHG

## 2024-11-04 DIAGNOSIS — E89.1 POSTPROCEDURAL HYPOINSULINEMIA: ICD-10-CM

## 2024-11-04 DIAGNOSIS — K80.20 CHOLELITHIASIS: Primary | ICD-10-CM

## 2024-11-04 PROCEDURE — 99204 OFFICE O/P NEW MOD 45 MIN: CPT | Performed by: SURGERY

## 2024-11-04 RX ORDER — METRONIDAZOLE 500 MG/100ML
500 INJECTION, SOLUTION INTRAVENOUS ONCE
OUTPATIENT
Start: 2024-12-31

## 2024-11-04 RX ORDER — SODIUM CHLORIDE, SODIUM LACTATE, POTASSIUM CHLORIDE, CALCIUM CHLORIDE 600; 310; 30; 20 MG/100ML; MG/100ML; MG/100ML; MG/100ML
125 INJECTION, SOLUTION INTRAVENOUS CONTINUOUS
OUTPATIENT
Start: 2024-12-31

## 2024-11-04 RX ORDER — CEFAZOLIN SODIUM 2 G/50ML
2000 SOLUTION INTRAVENOUS ONCE
OUTPATIENT
Start: 2024-12-31

## 2024-12-04 NOTE — PROGRESS NOTES
Assessment/Plan:   Alexandru Smith is a 64 y.o.male who is here for Consult (GALLBLADDER//PT stated he had 2 gallbladder episodes, he felt the pain around his abdomen and his back stated it wrapped around him. The second episode did affect his eating habits due to the pain, bowel movements have no issues. A ct scan was done during his ED visit on 10/26/2024.)  .    Plan: Chronic cholecystitis - discussed operative vs conservative mgt, surgical approaches, risks and benefits and patient understands that his pain may not resolve after surgery. Pt understands and wishes to proceed with laparoscopic cholecystectomy     Preoperative Clearance: Medical and Cardiac    HPI:  Alexandru Smith is a 64 y.o.male who was referred for evaluation of Consult (GALLBLADDER//PT stated he had 2 gallbladder episodes, he felt the pain around his abdomen and his back stated it wrapped around him. The second episode did affect his eating habits due to the pain, bowel movements have no issues. A ct scan was done during his ED visit on 10/26/2024.)  .    Currently abd pain.     ROS:  General ROS: negative  negative for - chills, fatigue, fever or night sweats, weight loss  Respiratory ROS: no cough, shortness of breath, or wheezing  Cardiovascular ROS: no chest pain or dyspnea on exertion  Genito-Urinary ROS: no dysuria, trouble voiding, or hematuria  Musculoskeletal ROS: negative for - gait disturbance, joint pain or muscle pain  Neurological ROS: no TIA or stroke symptoms  abdominal pain    [unfilled]  Iv contrast [iodinated contrast media]    Current Outpatient Medications:     aspirin 81 mg chewable tablet, Chew 81 mg daily, Disp: , Rfl:     atorvastatin (LIPITOR) 80 mg tablet, Take 80 mg by mouth daily, Disp: , Rfl:     bumetanide (BUMEX) 1 mg tablet, Take 1 mg by mouth daily, Disp: , Rfl:     Cholecalciferol (Vitamin D3) 50 MCG (2000 UT) capsule, Take 2,000 Units by mouth daily, Disp: , Rfl:     clopidogrel (PLAVIX) 75  mg tablet, Take 75 mg by mouth daily, Disp: , Rfl:     cyclobenzaprine (FLEXERIL) 10 mg tablet, Take 10 mg by mouth, Disp: , Rfl:     levothyroxine 50 mcg tablet, Take 50 mcg by mouth daily, Disp: , Rfl:     Levothyroxine Sodium 50 MCG CAPS, Take 50 mcg by mouth daily, Disp: , Rfl:     metoprolol succinate (TOPROL-XL) 50 mg 24 hr tablet, TAKE 1 TABLET BY MOUTH TWICE A DAY FOR 3 MONTHS, Disp: , Rfl:     montelukast (SINGULAIR) 10 mg tablet, Take 10 mg by mouth daily at bedtime, Disp: , Rfl:   Past Medical History:   Diagnosis Date    Anemia     Asthma     CHF (congestive heart failure) (HCC)     Chronic fatigue syndrome     Coronary artery disease     Hyperlipidemia     Hypertension     Hypothyroidism     Obesity     Respiratory disease      Past Surgical History:   Procedure Laterality Date    CARDIAC CATHETERIZATION      CORONARY ANGIOPLASTY WITH STENT PLACEMENT      4 stents    GASTRIC BYPASS      2020    HERNIA REPAIR      KNEE SURGERY Right     TONSILLECTOMY       History reviewed. No pertinent family history.   reports that he has never smoked. He has never been exposed to tobacco smoke. He does not have any smokeless tobacco history on file. He reports that he does not drink alcohol and does not use drugs.    Labs:   Lab Results   Component Value Date    WBC 8.94 10/26/2024    WBC 6.64 05/18/2024    HGB 15.9 10/26/2024    HGB 14.7 05/18/2024     10/26/2024     05/18/2024     Lab Results   Component Value Date    ALT 59 (H) 10/26/2024    ALT 33 05/18/2024    AST 83 (H) 10/26/2024    AST 50 (H) 05/18/2024     This SmartLink has not been configured with any valid records.       PHYSICAL EXAM  General Appearance:    Alert, cooperative, no distress,    Head:    Normocephalic without obvious abnormality   Eyes:    PERRL, conjunctiva/corneas clear, EOM's intact        Neck:   Supple, no adenopathy, no JVD   Back:     Symmetric, no spinal or CVA tenderness   Lungs:     Clear to auscultation bilaterally,  "no wheezing or rhonchi   Heart:    Regular rate and rhythm, S1 and S2 normal, no murmur   Abdomen:     Soft +BS ND NT    Extremities:   Extremities normal. No clubbing, cyanosis or edema   Psych:   Normal Affect, AOx3.    Neurologic:  Skin:   CNII-XII intact. Strength symmetric, speech intact    Warm, dry, intact, no visible rashes or lesions         Physical Exam              Some portions of this record may have been generated with voice recognition software. There may be translation, syntax,  or grammatical errors. Occasional wrong word or \"sound-a-like\" substitutions may have occurred due to the inherent limitations of the voice recognition software. Read the chart carefully and recognize, using context, where substitutions may have occurred. If you have any questions, please contact the dictating provider for clarification or correction, as needed. This encounter has been coded by a non-certified coder.   "

## 2024-12-09 LAB
BASOPHILS # BLD AUTO: 0.1 X10E3/UL (ref 0–0.2)
BASOPHILS NFR BLD AUTO: 1 %
BUN SERPL-MCNC: 22 MG/DL (ref 8–27)
BUN/CREAT SERPL: 18 (ref 10–24)
CALCIUM SERPL-MCNC: 9.7 MG/DL (ref 8.6–10.2)
CHLORIDE SERPL-SCNC: 103 MMOL/L (ref 96–106)
CO2 SERPL-SCNC: 25 MMOL/L (ref 20–29)
CREAT SERPL-MCNC: 1.21 MG/DL (ref 0.76–1.27)
EGFR: 68 ML/MIN/1.73
EOSINOPHIL # BLD AUTO: 0.1 X10E3/UL (ref 0–0.4)
EOSINOPHIL NFR BLD AUTO: 1 %
ERYTHROCYTE [DISTWIDTH] IN BLOOD BY AUTOMATED COUNT: 13.1 % (ref 11.6–15.4)
EST. AVERAGE GLUCOSE BLD GHB EST-MCNC: 123 MG/DL
GLUCOSE SERPL-MCNC: 94 MG/DL (ref 70–99)
HBA1C MFR BLD: 5.9 % (ref 4.8–5.6)
HCT VFR BLD AUTO: 45.7 % (ref 37.5–51)
HGB BLD-MCNC: 15.1 G/DL (ref 13–17.7)
IMM GRANULOCYTES # BLD: 0 X10E3/UL (ref 0–0.1)
IMM GRANULOCYTES NFR BLD: 0 %
LYMPHOCYTES # BLD AUTO: 2.2 X10E3/UL (ref 0.7–3.1)
LYMPHOCYTES NFR BLD AUTO: 34 %
MCH RBC QN AUTO: 30 PG (ref 26.6–33)
MCHC RBC AUTO-ENTMCNC: 33 G/DL (ref 31.5–35.7)
MCV RBC AUTO: 91 FL (ref 79–97)
MONOCYTES # BLD AUTO: 0.7 X10E3/UL (ref 0.1–0.9)
MONOCYTES NFR BLD AUTO: 10 %
NEUTROPHILS # BLD AUTO: 3.5 X10E3/UL (ref 1.4–7)
NEUTROPHILS NFR BLD AUTO: 54 %
PLATELET # BLD AUTO: 181 X10E3/UL (ref 150–450)
POTASSIUM SERPL-SCNC: 4.4 MMOL/L (ref 3.5–5.2)
RBC # BLD AUTO: 5.03 X10E6/UL (ref 4.14–5.8)
SODIUM SERPL-SCNC: 144 MMOL/L (ref 134–144)
WBC # BLD AUTO: 6.5 X10E3/UL (ref 3.4–10.8)

## 2024-12-16 ENCOUNTER — OFFICE VISIT (OUTPATIENT)
Dept: SURGERY | Facility: HOSPITAL | Age: 64
End: 2024-12-16
Payer: COMMERCIAL

## 2024-12-16 VITALS
TEMPERATURE: 97.7 F | BODY MASS INDEX: 41.62 KG/M2 | DIASTOLIC BLOOD PRESSURE: 75 MMHG | WEIGHT: 249.8 LBS | HEART RATE: 60 BPM | SYSTOLIC BLOOD PRESSURE: 131 MMHG | HEIGHT: 65 IN

## 2024-12-16 DIAGNOSIS — K80.10 CALCULUS OF GALLBLADDER WITH CHRONIC CHOLECYSTITIS WITHOUT OBSTRUCTION: Primary | ICD-10-CM

## 2024-12-16 PROCEDURE — 99213 OFFICE O/P EST LOW 20 MIN: CPT | Performed by: SURGERY

## 2024-12-23 NOTE — PROGRESS NOTES
Assessment/Plan:   Alexandru Smith is a 64 y.o.male who is here for Pre-op Exam (H/P UPDATE [facility]IL[facility] 12/31/2024//PT was seen back on 11/4/2024, stated that since then his blood pressure medication has changed since he was experiencing dizziness and other symptoms. He is not having any abdominal pain, no changes in bowel movements, no changes in appetite. )  .    Plan: Chronic cholecystitis - discussed operative vs conservative mgt, surgical approaches, risks and benefits and patient understands that his pain may not resolve after surgery. Pt understands and wishes to proceed with laparoscopic cholecystectomy     Preoperative Clearance: None    HPI:  Alexandru Smith is a 64 y.o.male who was referred for evaluation of Pre-op Exam (H/P UPDATE [facility]IL[facility] 12/31/2024//PT was seen back on 11/4/2024, stated that since then his blood pressure medication has changed since he was experiencing dizziness and other symptoms. He is not having any abdominal pain, no changes in bowel movements, no changes in appetite. )  .    Currently no abd pain.     ROS:  General ROS: negative  negative for - chills, fatigue, fever or night sweats, weight loss  Respiratory ROS: no cough, shortness of breath, or wheezing  Cardiovascular ROS: no chest pain or dyspnea on exertion  Genito-Urinary ROS: no dysuria, trouble voiding, or hematuria  Musculoskeletal ROS: negative for - gait disturbance, joint pain or muscle pain  Neurological ROS: no TIA or stroke symptoms  No abd pain    [unfilled]  Iv contrast [iodinated contrast media]    Current Outpatient Medications:     aspirin 81 mg chewable tablet, Chew 81 mg daily, Disp: , Rfl:     atorvastatin (LIPITOR) 80 mg tablet, Take 80 mg by mouth daily, Disp: , Rfl:     bumetanide (BUMEX) 1 mg tablet, Take 1 mg by mouth daily, Disp: , Rfl:     Cholecalciferol (Vitamin D3) 50 MCG (2000 UT) capsule, Take 2,000 Units by mouth daily, Disp: , Rfl:     clopidogrel (PLAVIX) 75 mg tablet, Take 75  mg by mouth daily, Disp: , Rfl:     cyclobenzaprine (FLEXERIL) 10 mg tablet, Take 10 mg by mouth, Disp: , Rfl:     levothyroxine 50 mcg tablet, Take 50 mcg by mouth daily, Disp: , Rfl:     Levothyroxine Sodium 50 MCG CAPS, Take 50 mcg by mouth daily, Disp: , Rfl:     metoprolol succinate (TOPROL-XL) 50 mg 24 hr tablet, TAKE 1 TABLET BY MOUTH TWICE A DAY FOR 3 MONTHS (Patient taking differently: 25 mg 25 mg in the morning, 25mg in the evening.), Disp: , Rfl:     montelukast (SINGULAIR) 10 mg tablet, Take 10 mg by mouth daily at bedtime, Disp: , Rfl:   Past Medical History:   Diagnosis Date    Anemia     Asthma     CHF (congestive heart failure) (HCC)     Chronic fatigue syndrome     Coronary artery disease     Hyperlipidemia     Hypertension     Hypothyroidism     Obesity     Respiratory disease      Past Surgical History:   Procedure Laterality Date    CARDIAC CATHETERIZATION      CORONARY ANGIOPLASTY WITH STENT PLACEMENT      4 stents    GASTRIC BYPASS      2020    HERNIA REPAIR      KNEE SURGERY Right     TONSILLECTOMY       History reviewed. No pertinent family history.   reports that he has never smoked. He has never been exposed to tobacco smoke. He does not have any smokeless tobacco history on file. He reports that he does not drink alcohol and does not use drugs.    Labs:   Lab Results   Component Value Date    WBC 6.5 12/09/2024    WBC 8.94 10/26/2024    WBC 6.64 05/18/2024    HGB 15.1 12/09/2024    HGB 15.9 10/26/2024    HGB 14.7 05/18/2024     12/09/2024     10/26/2024     05/18/2024     Lab Results   Component Value Date    ALT 59 (H) 10/26/2024    ALT 33 05/18/2024    AST 83 (H) 10/26/2024    AST 50 (H) 05/18/2024     This SmartLink has not been configured with any valid records.       PHYSICAL EXAM  General Appearance:    Alert, cooperative, no distress,    Head:    Normocephalic without obvious abnormality   Eyes:    PERRL, conjunctiva/corneas clear, EOM's intact        Neck:    "Supple, no adenopathy, no JVD   Back:     Symmetric, no spinal or CVA tenderness   Lungs:     Clear to auscultation bilaterally, no wheezing or rhonchi   Heart:    Regular rate and rhythm, S1 and S2 normal, no murmur   Abdomen:     Soft +BS ND NT   Extremities:   Extremities normal. No clubbing, cyanosis or edema   Psych:   Normal Affect, AOx3.    Neurologic:  Skin:   CNII-XII intact. Strength symmetric, speech intact    Warm, dry, intact, no visible rashes or lesions         Physical Exam              Some portions of this record may have been generated with voice recognition software. There may be translation, syntax,  or grammatical errors. Occasional wrong word or \"sound-a-like\" substitutions may have occurred due to the inherent limitations of the voice recognition software. Read the chart carefully and recognize, using context, where substitutions may have occurred. If you have any questions, please contact the dictating provider for clarification or correction, as needed. This encounter has been coded by a non-certified coder.   "